# Patient Record
Sex: FEMALE | Race: WHITE | Employment: OTHER | URBAN - METROPOLITAN AREA
[De-identification: names, ages, dates, MRNs, and addresses within clinical notes are randomized per-mention and may not be internally consistent; named-entity substitution may affect disease eponyms.]

---

## 2017-04-24 NOTE — DISCHARGE INSTRUCTIONS
See sheet      DISCHARGE SUMMARY from Nurse    The following personal items are in your possession at time of discharge:    Dental Appliances: None                               PATIENT INSTRUCTIONS:    After general anesthesia or intravenous sedation, for 24 hours or while taking prescription Narcotics:  · Limit your activities  · Do not drive and operate hazardous machinery  · Do not make important personal or business decisions  · Do  not drink alcoholic beverages  · If you have not urinated within 8 hours after discharge, please contact your surgeon on call. Report the following to your surgeon:  · Excessive pain, swelling, redness or odor of or around the surgical area  · Temperature over 100.5  · Nausea and vomiting lasting longer than 4 hours or if unable to take medications  · Any signs of decreased circulation or nerve impairment to extremity: change in color, persistent  numbness, tingling, coldness or increase pain  · Any questions        What to do at Home:  Recommended activity: See surgical instructions,     If you experience any of the following symptoms as instructed, please follow up with Dr Sen Jaeger. *  Please give a list of your current medications to your Primary Care Provider. *  Please update this list whenever your medications are discontinued, doses are      changed, or new medications (including over-the-counter products) are added. *  Please carry medication information at all times in case of emergency situations. These are general instructions for a healthy lifestyle:    No smoking/ No tobacco products/ Avoid exposure to second hand smoke    Surgeon General's Warning:  Quitting smoking now greatly reduces serious risk to your health.     Obesity, smoking, and sedentary lifestyle greatly increases your risk for illness    A healthy diet, regular physical exercise & weight monitoring are important for maintaining a healthy lifestyle    You may be retaining fluid if you have a history of heart failure or if you experience any of the following symptoms:  Weight gain of 3 pounds or more overnight or 5 pounds in a week, increased swelling in our hands or feet or shortness of breath while lying flat in bed. Please call your doctor as soon as you notice any of these symptoms; do not wait until your next office visit. Recognize signs and symptoms of STROKE:    F-face looks uneven    A-arms unable to move or move unevenly    S-speech slurred or non-existent    T-time-call 911 as soon as signs and symptoms begin-DO NOT go       Back to bed or wait to see if you get better-TIME IS BRAIN. Warning Signs of HEART ATTACK     Call 911 if you have these symptoms:   Chest discomfort. Most heart attacks involve discomfort in the center of the chest that lasts more than a few minutes, or that goes away and comes back. It can feel like uncomfortable pressure, squeezing, fullness, or pain.  Discomfort in other areas of the upper body. Symptoms can include pain or discomfort in one or both arms, the back, neck, jaw, or stomach.  Shortness of breath with or without chest discomfort.  Other signs may include breaking out in a cold sweat, nausea, or lightheadedness. Don't wait more than five minutes to call 911 - MINUTES MATTER! Fast action can save your life. Calling 911 is almost always the fastest way to get lifesaving treatment. Emergency Medical Services staff can begin treatment when they arrive -- up to an hour sooner than if someone gets to the hospital by car. The discharge information has been reviewed with the patient and daughter. The patient and daughter verbalized understanding. Discharge medications reviewed with the patient and daughter and appropriate educational materials and side effects teaching were provided.

## 2017-04-24 NOTE — BRIEF OP NOTE
BRIEF OPERATIVE NOTE    Date of Procedure: 4/25/2017   Preoperative Diagnosis: DACRYOCYSTITIS   Postoperative Diagnosis: * No post-op diagnosis entered *    Procedure(s):  LEFT DACROCYSTORHINOTOMY WITH STENT  Surgeon(s) and Role:     * Smita Gannon MD - Primary         Assistant Staff:       Surgical Staff:  Circ-1: Luis Martinez RN  Scrub RN-1: Fabian Chowdary RN  No case tracking events are documented in the log.   Anesthesia: General   Estimated Blood Loss: <50cc  Specimens: * No specimens in log *   Findings: none   Complications: none  Implants: * No implants in log *

## 2017-04-25 ENCOUNTER — ANESTHESIA (OUTPATIENT)
Dept: MEDSURG UNIT | Age: 82
End: 2017-04-25
Payer: MEDICARE

## 2017-04-25 ENCOUNTER — ANESTHESIA EVENT (OUTPATIENT)
Dept: MEDSURG UNIT | Age: 82
End: 2017-04-25
Payer: MEDICARE

## 2017-04-25 ENCOUNTER — HOSPITAL ENCOUNTER (OUTPATIENT)
Age: 82
Setting detail: OUTPATIENT SURGERY
Discharge: HOME | End: 2017-04-25
Attending: OPHTHALMOLOGY | Admitting: OPHTHALMOLOGY
Payer: MEDICARE

## 2017-04-25 VITALS
TEMPERATURE: 97.5 F | DIASTOLIC BLOOD PRESSURE: 67 MMHG | BODY MASS INDEX: 27.23 KG/M2 | SYSTOLIC BLOOD PRESSURE: 124 MMHG | HEIGHT: 62 IN | RESPIRATION RATE: 21 BRPM | OXYGEN SATURATION: 96 % | HEART RATE: 60 BPM | WEIGHT: 148 LBS

## 2017-04-25 LAB
ANION GAP BLD CALC-SCNC: 21 MMOL/L (ref 5–15)
BUN BLD-MCNC: 41 MG/DL (ref 9–20)
CA-I BLD-MCNC: 1.38 MMOL/L (ref 1.12–1.32)
CHLORIDE BLD-SCNC: 105 MMOL/L (ref 98–107)
CO2 BLD-SCNC: 23 MMOL/L (ref 21–32)
CREAT BLD-MCNC: 1.4 MG/DL (ref 0.6–1.3)
GLUCOSE BLD-MCNC: 84 MG/DL (ref 65–100)
HCT VFR BLD CALC: 28 % (ref 35–47)
HGB BLD-MCNC: 9.5 GM/DL (ref 11.5–16)
POTASSIUM BLD-SCNC: 3.5 MMOL/L (ref 3.5–5.1)
SERVICE CMNT-IMP: ABNORMAL
SODIUM BLD-SCNC: 144 MMOL/L (ref 136–145)

## 2017-04-25 PROCEDURE — 74011000250 HC RX REV CODE- 250: Performed by: OPHTHALMOLOGY

## 2017-04-25 PROCEDURE — 76060000062 HC AMB SURG ANES 1 TO 1.5 HR: Performed by: OPHTHALMOLOGY

## 2017-04-25 PROCEDURE — 77030009776 HC INTUB SET LACR JDMD -B: Performed by: OPHTHALMOLOGY

## 2017-04-25 PROCEDURE — 77030008684 HC TU ET CUF COVD -B: Performed by: ANESTHESIOLOGY

## 2017-04-25 PROCEDURE — 74011250636 HC RX REV CODE- 250/636: Performed by: OPHTHALMOLOGY

## 2017-04-25 PROCEDURE — 74011250636 HC RX REV CODE- 250/636

## 2017-04-25 PROCEDURE — 77030026438 HC STYL ET INTUB CARD -A: Performed by: ANESTHESIOLOGY

## 2017-04-25 PROCEDURE — 74011250637 HC RX REV CODE- 250/637: Performed by: ANESTHESIOLOGY

## 2017-04-25 PROCEDURE — 77030002916 HC SUT ETHLN J&J -A: Performed by: OPHTHALMOLOGY

## 2017-04-25 PROCEDURE — 74011000250 HC RX REV CODE- 250

## 2017-04-25 PROCEDURE — 77030002983 HC SUT POLYSRB COVD -A: Performed by: OPHTHALMOLOGY

## 2017-04-25 PROCEDURE — 74011250636 HC RX REV CODE- 250/636: Performed by: ANESTHESIOLOGY

## 2017-04-25 PROCEDURE — 77030002996 HC SUT SLK J&J -A: Performed by: OPHTHALMOLOGY

## 2017-04-25 PROCEDURE — 77030003029 HC SUT VCRL J&J -B: Performed by: OPHTHALMOLOGY

## 2017-04-25 PROCEDURE — 80047 BASIC METABLC PNL IONIZED CA: CPT

## 2017-04-25 PROCEDURE — 77030018846 HC SOL IRR STRL H20 ICUM -A: Performed by: OPHTHALMOLOGY

## 2017-04-25 PROCEDURE — 76030000019 HC AMB SURG 1 TO 1.5 HR INTENSV-TIER 1: Performed by: OPHTHALMOLOGY

## 2017-04-25 PROCEDURE — 77030002933 HC SUT MCRYL J&J -A: Performed by: OPHTHALMOLOGY

## 2017-04-25 PROCEDURE — 76210000032 HC AMBSU PH I REC 3 TO 3.5 HR: Performed by: OPHTHALMOLOGY

## 2017-04-25 RX ORDER — SODIUM CHLORIDE 9 MG/ML
50 INJECTION, SOLUTION INTRAVENOUS CONTINUOUS
Status: DISCONTINUED | OUTPATIENT
Start: 2017-04-25 | End: 2017-04-25 | Stop reason: HOSPADM

## 2017-04-25 RX ORDER — OXYMETAZOLINE HCL 0.05 %
2 SPRAY, NON-AEROSOL (ML) NASAL
Status: DISCONTINUED | OUTPATIENT
Start: 2017-04-25 | End: 2017-04-25 | Stop reason: HOSPADM

## 2017-04-25 RX ORDER — MIDAZOLAM HYDROCHLORIDE 1 MG/ML
1 INJECTION, SOLUTION INTRAMUSCULAR; INTRAVENOUS AS NEEDED
Status: DISCONTINUED | OUTPATIENT
Start: 2017-04-25 | End: 2017-04-25 | Stop reason: HOSPADM

## 2017-04-25 RX ORDER — ROCURONIUM BROMIDE 10 MG/ML
INJECTION, SOLUTION INTRAVENOUS AS NEEDED
Status: DISCONTINUED | OUTPATIENT
Start: 2017-04-25 | End: 2017-04-25 | Stop reason: HOSPADM

## 2017-04-25 RX ORDER — PHENYLEPHRINE HCL IN 0.9% NACL 0.4MG/10ML
SYRINGE (ML) INTRAVENOUS AS NEEDED
Status: DISCONTINUED | OUTPATIENT
Start: 2017-04-25 | End: 2017-04-25 | Stop reason: HOSPADM

## 2017-04-25 RX ORDER — NEOMYCIN SULFATE, POLYMYXIN B SULFATE, AND DEXAMETHASONE 3.5; 10000; 1 MG/G; [USP'U]/G; MG/G
1 OINTMENT OPHTHALMIC 4 TIMES DAILY
Qty: 1 TUBE | Refills: 1 | Status: SHIPPED | OUTPATIENT
Start: 2017-04-25 | End: 2017-05-09

## 2017-04-25 RX ORDER — TRAMADOL HYDROCHLORIDE AND ACETAMINOPHEN 37.5; 325 MG/1; MG/1
1 TABLET ORAL
Qty: 10 TAB | Refills: 0 | Status: SHIPPED | OUTPATIENT
Start: 2017-04-25 | End: 2017-04-27

## 2017-04-25 RX ORDER — AMOXICILLIN AND CLAVULANATE POTASSIUM 875; 125 MG/1; MG/1
1 TABLET, FILM COATED ORAL 2 TIMES DAILY
COMMUNITY
End: 2017-06-25

## 2017-04-25 RX ORDER — NEOMYCIN SULFATE, POLYMYXIN B SULFATE, AND DEXAMETHASONE 3.5; 10000; 1 MG/G; [USP'U]/G; MG/G
OINTMENT OPHTHALMIC ONCE
Status: DISCONTINUED | OUTPATIENT
Start: 2017-04-25 | End: 2017-04-25 | Stop reason: SDUPTHER

## 2017-04-25 RX ORDER — HYDROMORPHONE HYDROCHLORIDE 1 MG/ML
0.2 INJECTION, SOLUTION INTRAMUSCULAR; INTRAVENOUS; SUBCUTANEOUS
Status: DISCONTINUED | OUTPATIENT
Start: 2017-04-25 | End: 2017-04-25 | Stop reason: HOSPADM

## 2017-04-25 RX ORDER — NEOMYCIN SULFATE, POLYMYXIN B SULFATE, AND DEXAMETHASONE 3.5; 10000; 1 MG/G; [USP'U]/G; MG/G
OINTMENT OPHTHALMIC AS NEEDED
Status: DISCONTINUED | OUTPATIENT
Start: 2017-04-25 | End: 2017-04-25 | Stop reason: HOSPADM

## 2017-04-25 RX ORDER — NEOMYCIN SULFATE, POLYMYXIN B SULFATE, AND DEXAMETHASONE 3.5; 10000; 1 MG/G; [USP'U]/G; MG/G
OINTMENT OPHTHALMIC
Qty: 1 TUBE | Refills: 1 | Status: SHIPPED | OUTPATIENT
Start: 2017-04-25

## 2017-04-25 RX ORDER — CEPHALEXIN 250 MG/1
250 CAPSULE ORAL 4 TIMES DAILY
Qty: 20 CAP | Refills: 0 | Status: SHIPPED | OUTPATIENT
Start: 2017-04-25 | End: 2017-04-30

## 2017-04-25 RX ORDER — SUCCINYLCHOLINE CHLORIDE 20 MG/ML
INJECTION INTRAMUSCULAR; INTRAVENOUS AS NEEDED
Status: DISCONTINUED | OUTPATIENT
Start: 2017-04-25 | End: 2017-04-25 | Stop reason: HOSPADM

## 2017-04-25 RX ORDER — DEXAMETHASONE SODIUM PHOSPHATE 4 MG/ML
INJECTION, SOLUTION INTRA-ARTICULAR; INTRALESIONAL; INTRAMUSCULAR; INTRAVENOUS; SOFT TISSUE AS NEEDED
Status: DISCONTINUED | OUTPATIENT
Start: 2017-04-25 | End: 2017-04-25 | Stop reason: HOSPADM

## 2017-04-25 RX ORDER — OXYCODONE AND ACETAMINOPHEN 5; 325 MG/1; MG/1
1 TABLET ORAL AS NEEDED
Status: DISCONTINUED | OUTPATIENT
Start: 2017-04-25 | End: 2017-04-25 | Stop reason: HOSPADM

## 2017-04-25 RX ORDER — SODIUM CHLORIDE, SODIUM LACTATE, POTASSIUM CHLORIDE, CALCIUM CHLORIDE 600; 310; 30; 20 MG/100ML; MG/100ML; MG/100ML; MG/100ML
75 INJECTION, SOLUTION INTRAVENOUS CONTINUOUS
Status: DISCONTINUED | OUTPATIENT
Start: 2017-04-25 | End: 2017-04-25 | Stop reason: HOSPADM

## 2017-04-25 RX ORDER — SODIUM CHLORIDE 0.9 % (FLUSH) 0.9 %
5-10 SYRINGE (ML) INJECTION AS NEEDED
Status: DISCONTINUED | OUTPATIENT
Start: 2017-04-25 | End: 2017-04-25 | Stop reason: HOSPADM

## 2017-04-25 RX ORDER — DIPHENHYDRAMINE HYDROCHLORIDE 50 MG/ML
12.5 INJECTION, SOLUTION INTRAMUSCULAR; INTRAVENOUS AS NEEDED
Status: DISCONTINUED | OUTPATIENT
Start: 2017-04-25 | End: 2017-04-25 | Stop reason: HOSPADM

## 2017-04-25 RX ORDER — SODIUM CHLORIDE, SODIUM LACTATE, POTASSIUM CHLORIDE, CALCIUM CHLORIDE 600; 310; 30; 20 MG/100ML; MG/100ML; MG/100ML; MG/100ML
INJECTION, SOLUTION INTRAVENOUS
Status: DISCONTINUED | OUTPATIENT
Start: 2017-04-25 | End: 2017-04-25 | Stop reason: HOSPADM

## 2017-04-25 RX ORDER — FENTANYL CITRATE 50 UG/ML
INJECTION, SOLUTION INTRAMUSCULAR; INTRAVENOUS AS NEEDED
Status: DISCONTINUED | OUTPATIENT
Start: 2017-04-25 | End: 2017-04-25 | Stop reason: HOSPADM

## 2017-04-25 RX ORDER — SODIUM CHLORIDE 0.9 % (FLUSH) 0.9 %
5-10 SYRINGE (ML) INJECTION EVERY 8 HOURS
Status: DISCONTINUED | OUTPATIENT
Start: 2017-04-25 | End: 2017-04-25 | Stop reason: HOSPADM

## 2017-04-25 RX ORDER — LIDOCAINE HYDROCHLORIDE 20 MG/ML
INJECTION, SOLUTION EPIDURAL; INFILTRATION; INTRACAUDAL; PERINEURAL AS NEEDED
Status: DISCONTINUED | OUTPATIENT
Start: 2017-04-25 | End: 2017-04-25 | Stop reason: HOSPADM

## 2017-04-25 RX ORDER — FENTANYL CITRATE 50 UG/ML
50 INJECTION, SOLUTION INTRAMUSCULAR; INTRAVENOUS AS NEEDED
Status: DISCONTINUED | OUTPATIENT
Start: 2017-04-25 | End: 2017-04-25 | Stop reason: HOSPADM

## 2017-04-25 RX ORDER — CEFAZOLIN SODIUM 1 G/3ML
INJECTION, POWDER, FOR SOLUTION INTRAMUSCULAR; INTRAVENOUS AS NEEDED
Status: DISCONTINUED | OUTPATIENT
Start: 2017-04-25 | End: 2017-04-25 | Stop reason: HOSPADM

## 2017-04-25 RX ORDER — FENTANYL CITRATE 50 UG/ML
25 INJECTION, SOLUTION INTRAMUSCULAR; INTRAVENOUS
Status: DISCONTINUED | OUTPATIENT
Start: 2017-04-25 | End: 2017-04-25 | Stop reason: HOSPADM

## 2017-04-25 RX ORDER — LIDOCAINE HYDROCHLORIDE 10 MG/ML
0.1 INJECTION, SOLUTION EPIDURAL; INFILTRATION; INTRACAUDAL; PERINEURAL AS NEEDED
Status: DISCONTINUED | OUTPATIENT
Start: 2017-04-25 | End: 2017-04-25 | Stop reason: HOSPADM

## 2017-04-25 RX ORDER — ONDANSETRON 2 MG/ML
4 INJECTION INTRAMUSCULAR; INTRAVENOUS AS NEEDED
Status: DISCONTINUED | OUTPATIENT
Start: 2017-04-25 | End: 2017-04-25 | Stop reason: HOSPADM

## 2017-04-25 RX ORDER — ONDANSETRON 2 MG/ML
INJECTION INTRAMUSCULAR; INTRAVENOUS AS NEEDED
Status: DISCONTINUED | OUTPATIENT
Start: 2017-04-25 | End: 2017-04-25 | Stop reason: HOSPADM

## 2017-04-25 RX ORDER — PROPOFOL 10 MG/ML
INJECTION, EMULSION INTRAVENOUS AS NEEDED
Status: DISCONTINUED | OUTPATIENT
Start: 2017-04-25 | End: 2017-04-25 | Stop reason: HOSPADM

## 2017-04-25 RX ORDER — MORPHINE SULFATE 2 MG/ML
2 INJECTION, SOLUTION INTRAMUSCULAR; INTRAVENOUS
Status: DISCONTINUED | OUTPATIENT
Start: 2017-04-25 | End: 2017-04-25 | Stop reason: HOSPADM

## 2017-04-25 RX ORDER — MIDAZOLAM HYDROCHLORIDE 1 MG/ML
0.5 INJECTION, SOLUTION INTRAMUSCULAR; INTRAVENOUS
Status: DISCONTINUED | OUTPATIENT
Start: 2017-04-25 | End: 2017-04-25 | Stop reason: HOSPADM

## 2017-04-25 RX ADMIN — Medication 60 MCG: at 11:44

## 2017-04-25 RX ADMIN — FENTANYL CITRATE 25 MCG: 50 INJECTION, SOLUTION INTRAMUSCULAR; INTRAVENOUS at 11:06

## 2017-04-25 RX ADMIN — OXYMETAZOLINE HYDROCHLORIDE 2 SPRAY: 5 SPRAY NASAL at 12:55

## 2017-04-25 RX ADMIN — ONDANSETRON 4 MG: 2 INJECTION INTRAMUSCULAR; INTRAVENOUS at 11:25

## 2017-04-25 RX ADMIN — DEXAMETHASONE SODIUM PHOSPHATE 4 MG: 4 INJECTION, SOLUTION INTRA-ARTICULAR; INTRALESIONAL; INTRAMUSCULAR; INTRAVENOUS; SOFT TISSUE at 11:25

## 2017-04-25 RX ADMIN — SODIUM CHLORIDE, SODIUM LACTATE, POTASSIUM CHLORIDE, CALCIUM CHLORIDE: 600; 310; 30; 20 INJECTION, SOLUTION INTRAVENOUS at 11:02

## 2017-04-25 RX ADMIN — Medication 80 MCG: at 11:33

## 2017-04-25 RX ADMIN — LIDOCAINE HYDROCHLORIDE 40 MG: 20 INJECTION, SOLUTION EPIDURAL; INFILTRATION; INTRACAUDAL; PERINEURAL at 11:06

## 2017-04-25 RX ADMIN — PROPOFOL 100 MG: 10 INJECTION, EMULSION INTRAVENOUS at 11:06

## 2017-04-25 RX ADMIN — SODIUM CHLORIDE, SODIUM LACTATE, POTASSIUM CHLORIDE, AND CALCIUM CHLORIDE 75 ML/HR: 600; 310; 30; 20 INJECTION, SOLUTION INTRAVENOUS at 10:57

## 2017-04-25 RX ADMIN — SUCCINYLCHOLINE CHLORIDE 80 MG: 20 INJECTION INTRAMUSCULAR; INTRAVENOUS at 11:06

## 2017-04-25 RX ADMIN — ROCURONIUM BROMIDE 5 MG: 10 INJECTION, SOLUTION INTRAVENOUS at 11:06

## 2017-04-25 RX ADMIN — OXYMETAZOLINE HYDROCHLORIDE 2 SPRAY: 5 SPRAY NASAL at 13:05

## 2017-04-25 RX ADMIN — Medication 40 MCG: at 11:06

## 2017-04-25 RX ADMIN — CEFAZOLIN SODIUM 1 G: 1 INJECTION, POWDER, FOR SOLUTION INTRAMUSCULAR; INTRAVENOUS at 11:19

## 2017-04-25 NOTE — ROUTINE PROCESS
Patient: George Torres MRN: [id number]  SSN: [id number]   YOB: 1931  Age: 80 y.o. Sex: female     Patient is status post Procedure(s):  LEFT DACROCYSTORHINOTOMY WITH STENT.     Surgeon(s) and Role:     * Jm Ritter MD - Primary    Local/Dose/Irrigation: SEE MAR                  Peripheral IV 04/25/17 Left;Posterior Hand (Active)   Site Assessment Clean, dry, & intact 4/25/2017 10:00 AM   Phlebitis Assessment 0 4/25/2017 10:00 AM   Infiltration Assessment 0 4/25/2017 10:00 AM   Dressing Status Clean, dry, & intact 4/25/2017 10:00 AM   Dressing Type Transparent 4/25/2017 10:00 AM   Hub Color/Line Status Pink 4/25/2017 10:00 AM            Airway - Endotracheal Tube 04/25/17 Oral (Active)                   Dressing/Packing:     Splint/Cast:  ]    Other:

## 2017-04-25 NOTE — IP AVS SNAPSHOT
[address] 
[phone] Patient: Consuelo Reynolds MRN: [id number] :10/27/1931 You are allergic to the following No active allergies Recent Documentation Height Weight BMI Smoking Status 1.575 m 67.1 kg 27.07 kg/m2 Never Assessed About your hospitalization You were admitted on:  2017 You last received care in the:  [facility] PACU You were discharged on:  2017 Unit phone number:  [phone] Why you were hospitalized Your primary diagnosis was:  Not on File Providers Seen During Your Hospitalizations Provider Role Specialty Primary office phone Katherne Lombard, MD Attending Provider Ophthalmology [phone] Your Primary Care Physician (PCP) Primary Care Physician Office Phone Office Fax Suzanna Jackman [phone] Follow-up Information Follow up With Details Comments Contact Info Jacob Blanca MD   [place] [address] [address] [facility] [place] [address] 
[phone] Current Discharge Medication List  
  
START taking these medications Dose & Instructions Dispensing Information Comments Morning Noon Evening Bedtime * cephALEXin 250 mg capsule Commonly known as:  Rashida Hoover Your last dose was: Your next dose is:    
   
   
 Dose:  250 mg Take 1 Cap by mouth four (4) times daily for 5 days. Quantity:  20 Cap Refills:  0  
     
   
   
   
  
 * cephALEXin 250 mg capsule Commonly known as:  Rashida Hoover Your last dose was: Your next dose is:    
   
   
 Dose:  250 mg Take 1 Cap by mouth four (4) times daily for 5 days. Quantity:  20 Cap Refills:  0  
     
   
   
   
  
 * neomycin-polymyxin-dexamethasone 3.5 mg/g-10,000 unit/g-0.1 % ophthalmic ointment Commonly known as:  MAXITROL Your last dose was: Your next dose is:    
   
   
 Dose:  1 g Apply 1 g to eye four (4) times daily for 14 days. (3 times/day to affected area and in the eye at bedtime) Quantity:  1 Tube Refills:  1  
     
   
   
   
  
 * neomycin-polymyxin-dexamethasone 3.5 mg/g-10,000 unit/g-0.1 % ophthalmic ointment Commonly known as:  MAXITROL Your last dose was: Your next dose is:    
   
   
 Apply BID to affected area  and qhs to affected eye. Quantity:  1 Tube Refills:  1  
     
   
   
   
  
 * traMADol-acetaminophen 37.5-325 mg per tablet Commonly known as:  ULTRACET Your last dose was: Your next dose is:    
   
   
 Dose:  1 Tab Take 1 Tab by mouth every four (4) hours as needed for Pain for up to 2 days. Max Daily Amount: 6 Tabs. Quantity:  10 Tab Refills:  0  
     
   
   
   
  
 * traMADol-acetaminophen 37.5-325 mg per tablet Commonly known as:  ULTRACET Your last dose was: Your next dose is:    
   
   
 Dose:  1 Tab Take 1 Tab by mouth every four (4) hours as needed for Pain for up to 2 days. Max Daily Amount: 6 Tabs. Quantity:  10 Tab Refills:  0  
     
   
   
   
  
 * Notice: This list has 6 medication(s) that are the same as other medications prescribed for you. Read the directions carefully, and ask your doctor or other care provider to review them with you. CONTINUE these medications which have NOT CHANGED Dose & Instructions Dispensing Information Comments Morning Noon Evening Bedtime ADVAIR DISKUS 100-50 mcg/dose diskus inhaler Generic drug:  fluticasone-salmeterol Your last dose was: Your next dose is:    
   
   
 Dose:  1 Puff Take 1 Puff by inhalation every twelve (12) hours. Refills:  0  
     
   
   
   
  
 allopurinol 100 mg tablet Commonly known as:  Casper Slater Your last dose was: Your next dose is: Take  by mouth daily. Refills:  0 amoxicillin-clavulanate 875-125 mg per tablet Commonly known as:  AUGMENTIN Your last dose was: Your next dose is:    
   
   
 Dose:  1 Tab Take 1 Tab by mouth two (2) times a day. Refills:  0 AZOPT 1 % ophthalmic suspension Generic drug:  brinzolamide Your last dose was: Your next dose is:    
   
   
 Dose:  1 Drop Administer 1 Drop to both eyes three (3) times daily. Refills:  0  
     
   
   
   
  
 digoxin 0.125 mg tablet Commonly known as:  LANOXIN Your last dose was: Your next dose is: Take  by mouth daily. Refills:  0 EVISTA 60 mg tablet Generic drug:  raloxifene Your last dose was: Your next dose is: Take  by mouth daily. Refills:  0  
     
   
   
   
  
 furosemide 40 mg tablet Commonly known as:  LASIX Your last dose was: Your next dose is:    
   
   
 Dose:  40 mg Take 40 mg by mouth daily. Refills:  0 GenTeal Moderate 0.3 % Drop Generic drug:  artificial tears(hypromellose) Your last dose was: Your next dose is:    
   
   
 Administer  to both eyes as needed. Refills:  0  
     
   
   
   
  
 levothyroxine 50 mcg tablet Commonly known as:  SYNTHROID Your last dose was: Your next dose is: Take  by mouth daily (before breakfast). Refills:  0 MUCINEX 600 mg SR tablet Generic drug:  guaiFENesin SR Your last dose was: Your next dose is:    
   
   
 Dose:  600 mg Take 600 mg by mouth two (2) times a day. Refills:  0 POTASSIUM CHLORIDE Your last dose was: Your next dose is:    
   
   
 by Does Not Apply route. Refills:  0 PROAIR HFA 90 mcg/actuation inhaler Generic drug:  albuterol Your last dose was: Your next dose is:    
   
   
 Dose:  1 Puff Take 1 Puff by inhalation. Refills:  0  
     
   
   
   
  
 RYTHMOL PO Your last dose was: Your next dose is: Take  by mouth. Refills:  0  
     
   
   
   
  
 XALATAN 0.005 % ophthalmic solution Generic drug:  latanoprost  
   
Your last dose was: Your next dose is:    
   
   
 Dose:  1 Drop Administer 1 Drop to both eyes nightly. Refills:  0 XARELTO 15 mg Tab tablet Generic drug:  rivaroxaban Your last dose was: Your next dose is:    
   
   
 Dose:  15 mg Take 15 mg by mouth daily. Refills:  0 Where to Get Your Medications Information on where to get these meds will be given to you by the nurse or doctor. ! Ask your nurse or doctor about these medications  
  cephALEXin 250 mg capsule  
 cephALEXin 250 mg capsule  
 neomycin-polymyxin-dexamethasone 3.5 mg/g-10,000 unit/g-0.1 % ophthalmic ointment  
 neomycin-polymyxin-dexamethasone 3.5 mg/g-10,000 unit/g-0.1 % ophthalmic ointment  
 traMADol-acetaminophen 37.5-325 mg per tablet  
 traMADol-acetaminophen 37.5-325 mg per tablet Discharge Instructions See sheet DISCHARGE SUMMARY from Nurse The following personal items are in your possession at time of discharge: 
 
Dental Appliances: None PATIENT INSTRUCTIONS: 
 
 
F-face looks uneven A-arms unable to move or move unevenly S-speech slurred or non-existent T-time-call 911 as soon as signs and symptoms begin-DO NOT go Back to bed or wait to see if you get better-TIME IS BRAIN. Warning Signs of HEART ATTACK Call 911 if you have these symptoms: ? Chest discomfort. Most heart attacks involve discomfort in the center of the chest that lasts more than a few minutes, or that goes away and comes back. It can feel like uncomfortable pressure, squeezing, fullness, or pain. ? Discomfort in other areas of the upper body. Symptoms can include pain or discomfort in one or both arms, the back, neck, jaw, or stomach. ? Shortness of breath with or without chest discomfort. ? Other signs may include breaking out in a cold sweat, nausea, or lightheadedness. Don't wait more than five minutes to call [address]! Fast action can save your life. Calling 911 is almost always the fastest way to get lifesaving treatment. Emergency Medical Services staff can begin treatment when they arrive  up to an hour sooner than if someone gets to the hospital by car. The discharge information has been reviewed with the patient and daughter. The patient and daughter verbalized understanding. Discharge medications reviewed with the patient and daughter and appropriate educational materials and side effects teaching were provided. Discharge Orders None Introducing [facility]! Yobani Ko introduces [employer] patient portal. Now you can access parts of your medical record, email your doctor's office, and request medication refills online. 1. In your internet browser, go to [email or web address] 2. Click on the First Time User? Click Here link in the Sign In box. You will see the New Member Sign Up page. 3. Enter your [employer] Access Code exactly as it appears below. You will not need to use this code after youve completed the sign-up process. If you do not sign up before the expiration date, you must request a new code. · [employer] Access Code: [email or web address] Expires: 7/12/2017 12:38 PM 
 
4.  Enter the last four digits of your Social Security Number (xxxx) and Date of Birth (mm/dd/yyyy) as indicated and click Submit. You will be taken to the next sign-up page. 5. Create a [employer] ID. This will be your [employer] login ID and cannot be changed, so think of one that is secure and easy to remember. 6. Create a [employer] password. You can change your password at any time. 7. Enter your Password Reset Question and Answer. This can be used at a later time if you forget your password. 8. Enter your e-mail address. You will receive e-mail notification when new information is available in [address] 9. Click Sign Up. You can now view and download portions of your medical record. 10. Click the Download Summary menu link to download a portable copy of your medical information. If you have questions, please visit the Frequently Asked Questions section of the [employer] website. Remember, [employer] is NOT to be used for urgent needs. For medical emergencies, dial 911. Now available from your iPhone and Android! General Information Please provide this summary of care documentation to your next provider. Patient Signature:  ____________________________________________________________ Date:  ____________________________________________________________  
  
Mohsen Bough Provider Signature:  ____________________________________________________________ Date:  ____________________________________________________________

## 2017-04-25 NOTE — ANESTHESIA PREPROCEDURE EVALUATION
Anesthetic History   No history of anesthetic complications            Review of Systems / Medical History  Patient summary reviewed, nursing notes reviewed and pertinent labs reviewed    Pulmonary  Within defined limits                 Neuro/Psych   Within defined limits           Cardiovascular              Pacemaker and CAD    Exercise tolerance: <4 METS     GI/Hepatic/Renal         Renal disease: CRI       Endo/Other      Hypothyroidism       Other Findings              Physical Exam    Airway  Mallampati: III  TM Distance: 4 - 6 cm  Neck ROM: decreased range of motion   Mouth opening: Normal     Cardiovascular  Regular rate and rhythm,  S1 and S2 normal,  no murmur, click, rub, or gallop  Rhythm: regular  Rate: normal         Dental  No notable dental hx       Pulmonary  Breath sounds clear to auscultation               Abdominal  GI exam deferred       Other Findings            Anesthetic Plan    ASA: 3  Anesthesia type: general          Induction: Intravenous  Anesthetic plan and risks discussed with: Patient and Family

## 2017-04-25 NOTE — PERIOP NOTES
Rec'd patient from OR. Drowsy. Eyes open spontaneously. Coughing up bloody mucous. Large blood clot from left nare. Dr Fernando Whipple aware. No orders at this time. 1245- Patient continues to have nosebleed from left nare and coughing up bloody mucous and clearing throat vigorously. Spoke with Dr Gregorio Montiel. Afrin spray ordered to help control bleeding. 1310-  Bleeding more controlled. Patient observed and states she is comfortable.

## 2017-04-25 NOTE — ANESTHESIA POSTPROCEDURE EVALUATION
Post-Anesthesia Evaluation and Assessment    Patient: Marylen Mungo MRN: [id number]  SSN: [id number]    YOB: 1931  Age: 80 y.o. Sex: female       Cardiovascular Function/Vital Signs  Visit Vitals    /75    Pulse (!) 106    Temp 36.4 °C (97.5 °F)    Resp 22    Ht 5' 2\" (1.575 m)    Wt 67.1 kg (148 lb)    SpO2 96%    BMI 27.07 kg/m2       Patient is status post general anesthesia for Procedure(s):  LEFT DACROCYSTORHINOTOMY WITH STENT. Nausea/Vomiting: None    Postoperative hydration reviewed and adequate. Pain:  Pain Scale 1: Numeric (0 - 10) (04/25/17 1331)  Pain Intensity 1: 0 (04/25/17 1331)   Managed    Neurological Status:   Neuro (WDL): Exceptions to WDL (04/25/17 1207)  Neuro  Neurologic State: Drowsy; Eyes open spontaneously (04/25/17 1207)   At baseline    Mental Status and Level of Consciousness: Alert and oriented     Pulmonary Status:   O2 Device: Nasal cannula (04/25/17 1211)   Adequate oxygenation and airway patent    Complications related to anesthesia: None    Post-anesthesia assessment completed.  No concerns    Signed By: Bushra Mahan DO     April 25, 2017

## 2017-04-25 NOTE — PERIOP NOTES
Patient resting. Nose bleed subsided. No complaints. VSS. Family brought to bedside to review discharge instructions.

## 2017-04-25 NOTE — IP AVS SNAPSHOT
Current Discharge Medication List  
  
START taking these medications Dose & Instructions Dispensing Information Comments Morning Noon Evening Bedtime * cephALEXin 250 mg capsule Commonly known as:  Azcresencioe Severs Your last dose was: Your next dose is:    
   
   
 Dose:  250 mg Take 1 Cap by mouth four (4) times daily for 5 days. Quantity:  20 Cap Refills:  0  
     
   
   
   
  
 * cephALEXin 250 mg capsule Commonly known as:  Azzie Severs Your last dose was: Your next dose is:    
   
   
 Dose:  250 mg Take 1 Cap by mouth four (4) times daily for 5 days. Quantity:  20 Cap Refills:  0  
     
   
   
   
  
 * neomycin-polymyxin-dexamethasone 3.5 mg/g-10,000 unit/g-0.1 % ophthalmic ointment Commonly known as:  MAXITROL Your last dose was: Your next dose is:    
   
   
 Dose:  1 g Apply 1 g to eye four (4) times daily for 14 days. (3 times/day to affected area and in the eye at bedtime) Quantity:  1 Tube Refills:  1  
     
   
   
   
  
 * neomycin-polymyxin-dexamethasone 3.5 mg/g-10,000 unit/g-0.1 % ophthalmic ointment Commonly known as:  MAXITROL Your last dose was: Your next dose is:    
   
   
 Apply BID to affected area  and qhs to affected eye. Quantity:  1 Tube Refills:  1  
     
   
   
   
  
 * traMADol-acetaminophen 37.5-325 mg per tablet Commonly known as:  ULTRACET Your last dose was: Your next dose is:    
   
   
 Dose:  1 Tab Take 1 Tab by mouth every four (4) hours as needed for Pain for up to 2 days. Max Daily Amount: 6 Tabs. Quantity:  10 Tab Refills:  0  
     
   
   
   
  
 * traMADol-acetaminophen 37.5-325 mg per tablet Commonly known as:  ULTRACET Your last dose was: Your next dose is:    
   
   
 Dose:  1 Tab Take 1 Tab by mouth every four (4) hours as needed for Pain for up to 2 days. Max Daily Amount: 6 Tabs. Quantity:  10 Tab Refills:  0  
     
   
   
   
  
 * Notice: This list has 6 medication(s) that are the same as other medications prescribed for you. Read the directions carefully, and ask your doctor or other care provider to review them with you. CONTINUE these medications which have NOT CHANGED Dose & Instructions Dispensing Information Comments Morning Noon Evening Bedtime ADVAIR DISKUS 100-50 mcg/dose diskus inhaler Generic drug:  fluticasone-salmeterol Your last dose was: Your next dose is:    
   
   
 Dose:  1 Puff Take 1 Puff by inhalation every twelve (12) hours. Refills:  0  
     
   
   
   
  
 allopurinol 100 mg tablet Commonly known as:  Huy Bharat Your last dose was: Your next dose is: Take  by mouth daily. Refills:  0  
     
   
   
   
  
 amoxicillin-clavulanate 875-125 mg per tablet Commonly known as:  AUGMENTIN Your last dose was: Your next dose is:    
   
   
 Dose:  1 Tab Take 1 Tab by mouth two (2) times a day. Refills:  0 AZOPT 1 % ophthalmic suspension Generic drug:  brinzolamide Your last dose was: Your next dose is:    
   
   
 Dose:  1 Drop Administer 1 Drop to both eyes three (3) times daily. Refills:  0  
     
   
   
   
  
 digoxin 0.125 mg tablet Commonly known as:  LANOXIN Your last dose was: Your next dose is: Take  by mouth daily. Refills:  0 EVISTA 60 mg tablet Generic drug:  raloxifene Your last dose was: Your next dose is: Take  by mouth daily. Refills:  0  
     
   
   
   
  
 furosemide 40 mg tablet Commonly known as:  LASIX Your last dose was: Your next dose is:    
   
   
 Dose:  40 mg Take 40 mg by mouth daily. Refills:  0 GenTeal Moderate 0.3 % Drop Generic drug:  artificial tears(hypromellose) Your last dose was: Your next dose is:    
   
   
 Administer  to both eyes as needed. Refills:  0  
     
   
   
   
  
 levothyroxine 50 mcg tablet Commonly known as:  SYNTHROID Your last dose was: Your next dose is: Take  by mouth daily (before breakfast). Refills:  0 MUCINEX 600 mg SR tablet Generic drug:  guaiFENesin SR Your last dose was: Your next dose is:    
   
   
 Dose:  600 mg Take 600 mg by mouth two (2) times a day. Refills:  0 POTASSIUM CHLORIDE Your last dose was: Your next dose is:    
   
   
 by Does Not Apply route. Refills:  0 PROAIR HFA 90 mcg/actuation inhaler Generic drug:  albuterol Your last dose was: Your next dose is:    
   
   
 Dose:  1 Puff Take 1 Puff by inhalation. Refills:  0  
     
   
   
   
  
 RYTHMOL PO Your last dose was: Your next dose is: Take  by mouth. Refills:  0  
     
   
   
   
  
 XALATAN 0.005 % ophthalmic solution Generic drug:  latanoprost  
   
Your last dose was: Your next dose is:    
   
   
 Dose:  1 Drop Administer 1 Drop to both eyes nightly. Refills:  0 XARELTO 15 mg Tab tablet Generic drug:  rivaroxaban Your last dose was: Your next dose is:    
   
   
 Dose:  15 mg Take 15 mg by mouth daily. Refills:  0 Where to Get Your Medications Information on where to get these meds will be given to you by the nurse or doctor. ! Ask your nurse or doctor about these medications  
  cephALEXin 250 mg capsule  
 cephALEXin 250 mg capsule  
 neomycin-polymyxin-dexamethasone 3.5 mg/g-10,000 unit/g-0.1 % ophthalmic ointment  
 neomycin-polymyxin-dexamethasone 3.5 mg/g-10,000 unit/g-0.1 % ophthalmic ointment  
 traMADol-acetaminophen 37.5-325 mg per tablet traMADol-acetaminophen 37.5-325 mg per tablet

## 2017-04-25 NOTE — OP NOTES
PREOPERATIVE DIAGNOSES:   1. Tearing, Left eye.  2.  Nasolacrimal duct obstruction, Left eye. POSTOPERATIVE DIAGNOSES:  1. Tearing, Left eye.  2.  Nasolacrimal duct obstruction, Left eye. 3.  Hypertrophic turbinate, left side    OPERATIVE PROCEDURES:  1. Dacryocystorhinostomy, Left side. 2.  Nasolacrimal duct probing with stent, Left side. 3.  Turbinectomy, left side    SURGEON:  Lupe Vera III, MD    ANESTHESIA:  General.      ESTIMATED BLOOD LOSS: Less than 30 mL. SPECIMENS REMOVED: None. COMPLICATIONS:    None. INDICATIONS FOR PROCEDURE:  Patient has nasolacrimal duct obstruction and tearing not controlled with conservative measures. DESCRIPTION OF PROCEDURE: After informed consent was obtained, the patient was taken to the operating room and placed on the operating table in the supine position. General anesthesia was induced by the anesthesia provider. Lidocaine 2% with Epinephrine was used to infiltrate the patients medial canthal region. An ethmoidal block was also performed. Then, two 4% Cocaine-soaked cottonoids were used to pack the patients left side of the nose. The patients face was prepped and draped in the usual sterile fashion. First, a #15 blade was used to make an incision along the previously marked medial canthal incision line on the Left side. Two four-prong retractors were used to retract the incision. Evangelistas scissors were used to spread to bone. Elverna Jose Carlos elevator was used to incise the periosteum and reflect it medially and laterally. The lateral reflection was used to encompass the nasolacrimal sac, which was pulled laterally. Then the nasolacrimal sac fossa was infractured with a pair of hemostats. Then, Kerrison Rongeurs were used to create a large bony ostium on the Left side. Next, the packing was removed. The remaining nasal mucosa was infiltrated with local anesthesia to help with hemostasis.  A #11 blade was used to create flaps from the anterior nasolacrimal sac and nasal mucosa. The posterior aspect of the nasolacrimal sac and nasal mucosa was removed with hemostats. It was noted that the turbinate was interfering with ostium site, so it was infiltrated with local anesthesia and removed with hemostats. Then a punctal dilator was used to dilate the upper and lower punctum on the Left side. The nasolacrimal duct probe was passed easily through the canalicular system, through the nasolacrimal sac and into the ostium. Then Oreilly probes and stents were passed through the upper and lower canalicular system, through the nasolacrimal sac and were retrieved through the nose with the Groove director. Next, a 6-0 silk suture was tied around the proximal end of the tubes to prevent retrograde movement of the stents. Then, a 4-0 Vicryl suture was used to sew the anterior flap of the nasal mucosa to the anterior flap of the nasolacrimal sac. Then, the subcutaneous tissues of the skin incision were closed with two buried 6-0 monocryl sutures. The skin itself was closed with a series of interrupted 6-0 plain gut sutures. The probes were then removed from the stents and the stents were tied with square knots and allowed to retract into the patients nose. The patient tolerated the procedure well without complication. The patients face was cleaned and dried. Antibiotic ointment was placed on the incision and in the left eye. A pressure patch was placed on the left eye. The patient was taken to the recovery room in stable condition. Estimated blood loss was less than 10 mL.

## 2017-06-25 ENCOUNTER — HOSPITAL ENCOUNTER (EMERGENCY)
Age: 82
Discharge: HOME | End: 2017-06-25
Attending: EMERGENCY MEDICINE | Admitting: EMERGENCY MEDICINE
Payer: MEDICARE

## 2017-06-25 VITALS
TEMPERATURE: 98.3 F | OXYGEN SATURATION: 95 % | RESPIRATION RATE: 18 BRPM | HEIGHT: 62 IN | SYSTOLIC BLOOD PRESSURE: 140 MMHG | BODY MASS INDEX: 27.63 KG/M2 | WEIGHT: 150.13 LBS | DIASTOLIC BLOOD PRESSURE: 71 MMHG | HEART RATE: 88 BPM

## 2017-06-25 DIAGNOSIS — H04.552 ACQUIRED TEAR DUCT STENOSIS, LEFT: Primary | ICD-10-CM

## 2017-06-25 PROCEDURE — 99283 EMERGENCY DEPT VISIT LOW MDM: CPT

## 2017-06-25 RX ORDER — MONTELUKAST SODIUM 10 MG/1
10 TABLET ORAL DAILY
COMMUNITY

## 2017-06-25 RX ORDER — SOTALOL HYDROCHLORIDE 80 MG/1
80 TABLET ORAL 2 TIMES DAILY
COMMUNITY

## 2017-06-25 RX ORDER — DICLOFENAC SODIUM 10 MG/G
2 GEL TOPICAL 4 TIMES DAILY
COMMUNITY

## 2017-06-25 RX ORDER — MECLIZINE HYDROCHLORIDE 25 MG/1
25 TABLET ORAL
COMMUNITY

## 2017-06-25 RX ORDER — ERYTHROMYCIN 5 MG/G
OINTMENT OPHTHALMIC
Qty: 3.5 G | Refills: 0 | Status: SHIPPED | OUTPATIENT
Start: 2017-06-25 | End: 2017-07-02

## 2017-06-25 NOTE — ED PROVIDER NOTES
HPI Comments: Candace Nolen is a 80 y.o. female with no pertinent PMHx who presents ambulatory to the ED for evaluation after her stent placement was accidentally removed from her eye. She endorses an associated symptom of mild eye irritation. Pt explains that she has stents placed in both of her eyes and she accidentally pulled the stent from her left eye night while using her eye drops. Pt notes that the stent in her left eye regularly becomes dislodged, and she always visits her Opthalmologist who pushes the stent back into place. She denies seeing her Opthalmologist for her current dislocation. PCP: Gissell Chaudhary MD  Opthalmologist: Vik Michel III, MD    There are no other complaints, changes or physical findings at this time. The history is provided by the patient. No  was used. Past Medical History:   Diagnosis Date    CAD (coronary artery disease)     Chronic kidney disease     kidney failure    Endocrine disease     hypothyroid    Other ill-defined conditions     gout       Past Surgical History:   Procedure Laterality Date    CARDIAC SURG PROCEDURE UNLIST      HX APPENDECTOMY      HX GYN      tubal ligation, hysterectomy    HX HEENT      tonsils    HX OTHER SURGICAL      hemmorhoidectomy    HX PACEMAKER           No family history on file. Social History     Social History    Marital status:      Spouse name: N/A    Number of children: N/A    Years of education: N/A     Occupational History    Not on file. Social History Main Topics    Smoking status: Not on file    Smokeless tobacco: Not on file    Alcohol use Not on file    Drug use: Not on file    Sexual activity: Not on file     Other Topics Concern    Not on file     Social History Narrative         ALLERGIES: Review of patient's allergies indicates no known allergies. Review of Systems   Constitutional: Negative.   Negative for appetite change, chills, fatigue and fever.   HENT: Negative. Negative for congestion, rhinorrhea, sinus pressure and sore throat. Eyes: Positive for pain.        +Foreign body in left eye   Respiratory: Negative. Negative for cough, choking, chest tightness, shortness of breath and wheezing. Cardiovascular: Negative. Negative for chest pain, palpitations and leg swelling. Gastrointestinal: Negative for abdominal pain, constipation, diarrhea, nausea and vomiting. Endocrine: Negative. Genitourinary: Negative. Negative for difficulty urinating, dysuria, flank pain and urgency. Musculoskeletal: Negative. Skin: Negative. Neurological: Negative. Negative for dizziness, speech difficulty, weakness, light-headedness, numbness and headaches. Psychiatric/Behavioral: Negative. All other systems reviewed and are negative. Vitals:    06/25/17 1051   BP: 140/71   Pulse: 88   Resp: 18   Temp: 98.3 °F (36.8 °C)   SpO2: 95%   Weight: 68.1 kg (150 lb 2.1 oz)   Height: 5' 2\" (1.575 m)            Physical Exam   Constitutional: She is oriented to person, place, and time. She appears well-developed and well-nourished. No distress. HENT:   Right Ear: External ear normal.   Left Ear: External ear normal.   Nose: Nose normal.   Mouth/Throat: Oropharynx is clear and moist. No oropharyngeal exudate. Obvious stent extending from medial canthus; No other defects or obvious foreign bodies; Visual acuity grossly intact   Eyes: Conjunctivae and EOM are normal. Pupils are equal, round, and reactive to light. Right eye exhibits no discharge. Left eye exhibits no discharge. No scleral icterus. Neck: Normal range of motion. Neck supple. No JVD present. No tracheal deviation present. Cardiovascular: Normal rate, regular rhythm, normal heart sounds and intact distal pulses. Exam reveals no gallop and no friction rub. No murmur heard. Pulmonary/Chest: Effort normal and breath sounds normal. No respiratory distress. She has no wheezes.  She has no rales. She exhibits no tenderness. Abdominal: Soft. Bowel sounds are normal. She exhibits no distension and no mass. There is no tenderness. There is no rebound and no guarding. Musculoskeletal: Normal range of motion. She exhibits no edema or tenderness. Lymphadenopathy:     She has no cervical adenopathy. Neurological: She is alert and oriented to person, place, and time. She has normal reflexes. No cranial nerve deficit. She exhibits normal muscle tone. Coordination normal.   Skin: Skin is warm and dry. She is not diaphoretic. Psychiatric: She has a normal mood and affect. Her behavior is normal. Judgment and thought content normal.   Nursing note and vitals reviewed. MDM  Number of Diagnoses or Management Options  Diagnosis management comments: DDx:foreign body, corneal abrasion to tear-duct, stent failure       Amount and/or Complexity of Data Reviewed  Review and summarize past medical records: yes  Discuss the patient with other providers: yes (Opthalmology)    Patient Progress  Patient progress: stable    ED Course       Procedures    CONSULT NOTE:  11:14 AM  Mag Franco PA-C spoke with Arron Lyn MD  Specialty: Opthalmology  Discussed patient's hx, disposition, and available diagnostic and imaging results. Reviewed care plans. Consultant agrees with plans as outlined. Send home with Azithromycin ointment and Dr. Gamboa Child will make an appointment and follow up with the pt at home. Written by Jordan Bajwa ED Scribe, as dictated by Mag Franco PA-C. IMPRESSION:  1. Acquired tear duct stenosis, left        PLAN:  1. Current Discharge Medication List      START taking these medications    Details   erythromycin (ILOTYCIN) ophthalmic ointment 1 cm l eye 6 times daily  Qty: 3.5 g, Refills: 0           2.    Follow-up Information     Follow up With Details Comments [id number] Simona Ndiaye MD  will call today to see in office [address] [facility] [place] [address]  [phone]          Return to ED if worse     DISCHARGE NOTE  11:26  AM  The patient has been re-evaluated and is ready for discharge. Reviewed available results with patient. Counseled patient on diagnosis and care plan. Patient has expressed understanding, and all questions have been answered. Patient agrees with plan and agrees to follow up as recommended, or return to the ED if their symptoms worsen. Discharge instructions have been provided and explained to the patient, along with reasons to return to the ED. ATTESTATION:  This note is prepared by Antonina Conroy, acting as Scribe for [employer]. [employer], PADanyellC: The scribe's documentation has been prepared under my direction and personally reviewed by me in its entirety. I confirm that the note above accurately reflects all work, treatment, procedures, and medical decision making performed by me.

## (undated) DEVICE — TELFA NON-ADHERENT PADS PREPAK: Brand: TELFA

## (undated) DEVICE — SUTURE MCRYL SZ 6-0 L18IN ABSRB UD L13MM P-3 3/8 CIR PRIM Y492G

## (undated) DEVICE — TOWEL,OR,DSP,ST,BLUE,STD,2/PK,40PK/CS: Brand: MEDLINE

## (undated) DEVICE — STERILE POLYISOPRENE POWDER-FREE SURGICAL GLOVES: Brand: PROTEXIS

## (undated) DEVICE — LACRIMAL INTUBATION SET CRAWFORD: Brand: CRAWFORD

## (undated) DEVICE — SYRINGE MED L0.5IN OD30GA 1ML LL W/ SFTY PIVOTING SHLD

## (undated) DEVICE — FRAZIER SUCTION INSTRUMENT 7 FR W/CONTROL VENT & OBTURATOR: Brand: FRAZIER

## (undated) DEVICE — SUTURE PERMA HND SZ 6 0 L18IN NONABSORBABLE BLK BV 1 L93MM K801H

## (undated) DEVICE — Z CONVERTED USE 2271043 CONTAINER SPEC COLL 4OZ SCR ON LID PEEL PCH

## (undated) DEVICE — CURITY PLAIN PACKING STRIP: Brand: CURITY

## (undated) DEVICE — SURGICAL PROCEDURE PACK STRAB BSR LF

## (undated) DEVICE — Device

## (undated) DEVICE — 1200 GUARD II KIT W/5MM TUBE W/O VAC TUBE: Brand: GUARDIAN

## (undated) DEVICE — SOLUTION IRRIG 1000ML H2O STRL BLT

## (undated) DEVICE — INTENDED FOR TISSUE SEPARATION, AND OTHER PROCEDURES THAT REQUIRE A SHARP SURGICAL BLADE TO PUNCTURE OR CUT.: Brand: BARD-PARKER ® CARBON RIB-BACK BLADES

## (undated) DEVICE — (D)SYR 10ML 1/5ML GRAD NSAF -- PKGING CHANGE USE ITEM 338027

## (undated) DEVICE — PACKING 8004008 NEURAY 200PK 25X76MM: Brand: NEURAY ®

## (undated) DEVICE — INFECTION CONTROL KIT SYS

## (undated) DEVICE — MEDI-VAC NON-CONDUCTIVE SUCTION TUBING: Brand: CARDINAL HEALTH

## (undated) DEVICE — SUT ETHLN 6-0 18IN P3 BLK --

## (undated) DEVICE — SKIN MARKER,REGULAR TIP WITH RULER AND LABELS: Brand: DEVON

## (undated) DEVICE — NEEDLE HYPO 27GA L1.25IN GRY POLYPR HUB S STL REG BVL STR

## (undated) DEVICE — EYE PADSSTERILENOT MADE WITH NATURAL RUBBER LATEXSINGLE USE ONLYDO NOT USE IF PACKAGE OPENED OR DAMAGED: Brand: CARDINAL HEALTH

## (undated) DEVICE — GOWN,SIRUS,FABRNF,XL,20/CS: Brand: MEDLINE